# Patient Record
Sex: MALE | Race: WHITE
[De-identification: names, ages, dates, MRNs, and addresses within clinical notes are randomized per-mention and may not be internally consistent; named-entity substitution may affect disease eponyms.]

---

## 2018-12-17 ENCOUNTER — HOSPITAL ENCOUNTER (EMERGENCY)
Dept: HOSPITAL 89 - ER | Age: 53
Discharge: HOME | End: 2018-12-17
Payer: COMMERCIAL

## 2018-12-17 DIAGNOSIS — N20.0: Primary | ICD-10-CM

## 2018-12-17 LAB — PLATELET COUNT, AUTOMATED: 224 K/UL (ref 150–450)

## 2018-12-17 PROCEDURE — 96375 TX/PRO/DX INJ NEW DRUG ADDON: CPT

## 2018-12-17 PROCEDURE — 82247 BILIRUBIN TOTAL: CPT

## 2018-12-17 PROCEDURE — 84450 TRANSFERASE (AST) (SGOT): CPT

## 2018-12-17 PROCEDURE — 82435 ASSAY OF BLOOD CHLORIDE: CPT

## 2018-12-17 PROCEDURE — 82310 ASSAY OF CALCIUM: CPT

## 2018-12-17 PROCEDURE — 84295 ASSAY OF SERUM SODIUM: CPT

## 2018-12-17 PROCEDURE — 96374 THER/PROPH/DIAG INJ IV PUSH: CPT

## 2018-12-17 PROCEDURE — 84132 ASSAY OF SERUM POTASSIUM: CPT

## 2018-12-17 PROCEDURE — 84520 ASSAY OF UREA NITROGEN: CPT

## 2018-12-17 PROCEDURE — 84155 ASSAY OF PROTEIN SERUM: CPT

## 2018-12-17 PROCEDURE — 82565 ASSAY OF CREATININE: CPT

## 2018-12-17 PROCEDURE — 82040 ASSAY OF SERUM ALBUMIN: CPT

## 2018-12-17 PROCEDURE — 84460 ALANINE AMINO (ALT) (SGPT): CPT

## 2018-12-17 PROCEDURE — 85025 COMPLETE CBC W/AUTO DIFF WBC: CPT

## 2018-12-17 PROCEDURE — 82947 ASSAY GLUCOSE BLOOD QUANT: CPT

## 2018-12-17 PROCEDURE — 84075 ASSAY ALKALINE PHOSPHATASE: CPT

## 2018-12-17 PROCEDURE — 81001 URINALYSIS AUTO W/SCOPE: CPT

## 2018-12-17 PROCEDURE — 96376 TX/PRO/DX INJ SAME DRUG ADON: CPT

## 2018-12-17 PROCEDURE — 82374 ASSAY BLOOD CARBON DIOXIDE: CPT

## 2018-12-17 PROCEDURE — 99284 EMERGENCY DEPT VISIT MOD MDM: CPT

## 2018-12-17 NOTE — ER REPORT
History and Physical


Time Seen By MD:  08:41


Hx. of Stated Complaint:  


PATIENT REPORTS POSSIBLE KIDNEY STONE. RIGHT SIDED FLANK PAIN


HPI/ROS


This is a 53-year-old male with a history of hepatitis C who presents to the 


emergency department stating that he feels as if he has another kidney stone. 


This could be his 5th episode. Right-sided flank pain over him at 3:00 in the 


morning. He denies abdominal pain. Says this is exactly like his past kidney 


stones. He has spontaneously passed all of his kidney stones. No fever chills. 


Or dysuria.


Remainder of the 14 system rev:  Yes


Allergies:  


Coded Allergies:  


     No Known Drug Allergies (Unverified , 12/17/18)


Home Meds


Active Scripts


Ondansetron Hcl (ZOFRAN) 4 Mg Tablet, 4 MG PO Q12H, #8 TAB


   Prov:HARI ALVARADO MD         12/17/18


Ibuprofen (IBUPROFEN) 600 Mg Tablet, 1 TAB PO Q6H, #14 TAB


   Prov:HARI ALVARADO MD         12/17/18


Hydrocodone Bit/Acetaminophen (HYDROCODON-ACETAMINOPHEN 5-325) 1 Each Tablet, 1 


EACH PO Q4H PRN for PAIN, #12 TAB 0 Refills


   Prov:HARI ALVARADO MD         12/17/18


Tamsulosin Hcl (FLOMAX) 0.4 Mg Cap.er.24h, 0.4 MG PO QDAY for 3 Days, #3 CAP 0 


Refills


   Prov:HARI ALVARADO MD         12/17/18


Reviewed Nurses Notes:  Yes


Old Medical Records Reviewed:  Yes


Constitutional





Vital Sign - Last 24 Hours








 12/17/18





 08:33


 


Temp 98.2


 


Pulse 82


 


Resp 20


 


Pulse Ox 87








Physical Exam


General Appearance: The patient is alert, has no immediate need for airway 


protection and no current signs of toxicity.  


Eyes: Pupils equal and round no injection.


Respiratory: Chest is non tender, lungs are clear to auscultation.


Cardiac: regular rate and rhythm 


Gastrointestinal: Abdomen is soft and non tender, no masses, bowel sounds 


normal.


Musculoskeletal: No midline spinal TTP


Extremities have full range of motion and are non tender.


Skin: No rashes or lesions.





DIFFERENTIAL DIAGNOSIS: After history and physical exam differential diagnosis 


was considered for back pain including but not limited to muscular pain, 


herniated disc, spine fracture, intra-abdominal causes and urinary tract 


infection.





Medical Decision Making


Data Points


Result Diagram:  


12/17/18 0839                                                                   


            12/17/18 0839





Laboratory





Hematology








Test


 12/17/18


08:33 12/17/18


08:39


 


Urine Color Yellow  


 


Urine Clarity


 Slightly-cloudy


 





 


Urine pH


 5.0 pH


(4.8-9.5) 





 


Urine Specific Gravity 1.017  


 


Urine Protein


 30 mg/dL


(NEGATIVE) 





 


Urine Glucose (UA)


 Negative mg/dL


(NEGATIVE) 





 


Urine Ketones


 Negative mg/dL


(NEGATIVE) 





 


Urine Blood


 Large


(NEGATIVE) 





 


Urine Nitrite


 Negative


(NEGATIVE) 





 


Urine Bilirubin


 Negative


(NEGATIVE) 





 


Urine Urobilinogen


 Negative mg/dL


(0.2-1.9) 





 


Urine Leukocyte Esterase


 Negative


(NEGATIVE) 





 


Urine RBC


 912 /HPF


(0-2/HPF) 





 


Urine WBC


 1 /HPF


(0-5/HPF) 





 


Urine Squamous Epithelial


Cells None /LPF


(</=FEW) 





 


Urine Bacteria


 Negative /HPF


(NONE-FEW) 





 


Urine Mucus


 None /HPF


(NONE-FEW) 





 


Red Blood Count


 


 5.43 M/uL


(4.00-5.60)


 


Mean Corpuscular Volume


 


 90.3 fL


(80.0-96.0)


 


Mean Corpuscular Hemoglobin


 


 30.2 pg


(26.0-33.0)


 


Mean Corpuscular Hemoglobin


Concent 


 33.5 g/dL


(32.0-36.0)


 


Red Cell Distribution Width


 


 14.3 %


(11.5-14.5)


 


Mean Platelet Volume


 


 9.0 fL


(7.2-11.1)


 


Neutrophils (%) (Auto)


 


 69.9 %


(39.4-72.5)


 


Lymphocytes (%) (Auto)


 


 20.6 %


(17.6-49.6)


 


Monocytes (%) (Auto)


 


 6.6 %


(4.1-12.4)


 


Eosinophils (%) (Auto)


 


 2.1 %


(0.4-6.7)


 


Basophils (%) (Auto)


 


 0.8 %


(0.3-1.4)


 


Nucleated RBC Relative Count


(auto) 


 0.1 /100WBC 





 


Neutrophils # (Auto)


 


 4.0 K/uL


(2.0-7.4)


 


Lymphocytes # (Auto)


 


 1.2 K/uL


(1.3-3.6)


 


Monocytes # (Auto)


 


 0.4 K/uL


(0.3-1.0)


 


Eosinophils # (Auto)


 


 0.1 K/uL


(0.0-0.5)


 


Basophils # (Auto)


 


 0.0 K/uL


(0.0-0.1)


 


Nucleated RBC Absolute Count


(auto) 


 0.00 K/uL 





 


Sodium Level


 


 141 mmol/L


(137-145)


 


Potassium Level


 


 4.0 mmol/L


(3.5-5.0)


 


Chloride Level


 


 107 mmol/L


()


 


Carbon Dioxide Level


 


 25 mmol/L


(22-30)


 


Blood Urea Nitrogen  9 mg/dl (9-21) 


 


Creatinine


 


 0.80 mg/dl


(0.66-1.25)


 


Glomerular Filtration Rate


Calc 


 > 60.0 





 


Random Glucose


 


 119 mg/dl


()


 


Calcium Level


 


 9.4 mg/dl


(8.4-10.2)


 


Total Bilirubin


 


 0.6 mg/dl


(0.2-1.3)


 


Aspartate Amino Transf


(AST/SGOT) 


 28 U/L (0-35) 





 


Alanine Aminotransferase


(ALT/SGPT) 


 40 U/L (0-56) 





 


Alkaline Phosphatase  59 U/L (0-126) 


 


Total Protein


 


 7.7 g/dl


(6.3-8.2)


 


Albumin


 


 4.3 g/dl


(3.5-5.0)








Chemistry








Test


 12/17/18


08:33 12/17/18


08:39


 


Urine Color Yellow  


 


Urine Clarity


 Slightly-cloudy


 





 


Urine pH


 5.0 pH


(4.8-9.5) 





 


Urine Specific Gravity 1.017  


 


Urine Protein


 30 mg/dL


(NEGATIVE) 





 


Urine Glucose (UA)


 Negative mg/dL


(NEGATIVE) 





 


Urine Ketones


 Negative mg/dL


(NEGATIVE) 





 


Urine Blood


 Large


(NEGATIVE) 





 


Urine Nitrite


 Negative


(NEGATIVE) 





 


Urine Bilirubin


 Negative


(NEGATIVE) 





 


Urine Urobilinogen


 Negative mg/dL


(0.2-1.9) 





 


Urine Leukocyte Esterase


 Negative


(NEGATIVE) 





 


Urine RBC


 912 /HPF


(0-2/HPF) 





 


Urine WBC


 1 /HPF


(0-5/HPF) 





 


Urine Squamous Epithelial


Cells None /LPF


(</=FEW) 





 


Urine Bacteria


 Negative /HPF


(NONE-FEW) 





 


Urine Mucus


 None /HPF


(NONE-FEW) 





 


White Blood Count


 


 5.8 k/uL


(4.5-11.0)


 


Red Blood Count


 


 5.43 M/uL


(4.00-5.60)


 


Hemoglobin


 


 16.4 g/dL


(14.0-18.0)


 


Hematocrit


 


 49.1 %


(42.0-52.0)


 


Mean Corpuscular Volume


 


 90.3 fL


(80.0-96.0)


 


Mean Corpuscular Hemoglobin


 


 30.2 pg


(26.0-33.0)


 


Mean Corpuscular Hemoglobin


Concent 


 33.5 g/dL


(32.0-36.0)


 


Red Cell Distribution Width


 


 14.3 %


(11.5-14.5)


 


Platelet Count


 


 224 K/uL


(150-450)


 


Mean Platelet Volume


 


 9.0 fL


(7.2-11.1)


 


Neutrophils (%) (Auto)


 


 69.9 %


(39.4-72.5)


 


Lymphocytes (%) (Auto)


 


 20.6 %


(17.6-49.6)


 


Monocytes (%) (Auto)


 


 6.6 %


(4.1-12.4)


 


Eosinophils (%) (Auto)


 


 2.1 %


(0.4-6.7)


 


Basophils (%) (Auto)


 


 0.8 %


(0.3-1.4)


 


Nucleated RBC Relative Count


(auto) 


 0.1 /100WBC 





 


Neutrophils # (Auto)


 


 4.0 K/uL


(2.0-7.4)


 


Lymphocytes # (Auto)


 


 1.2 K/uL


(1.3-3.6)


 


Monocytes # (Auto)


 


 0.4 K/uL


(0.3-1.0)


 


Eosinophils # (Auto)


 


 0.1 K/uL


(0.0-0.5)


 


Basophils # (Auto)


 


 0.0 K/uL


(0.0-0.1)


 


Nucleated RBC Absolute Count


(auto) 


 0.00 K/uL 





 


Glomerular Filtration Rate


Calc 


 > 60.0 





 


Calcium Level


 


 9.4 mg/dl


(8.4-10.2)


 


Total Bilirubin


 


 0.6 mg/dl


(0.2-1.3)


 


Aspartate Amino Transf


(AST/SGOT) 


 28 U/L (0-35) 





 


Alanine Aminotransferase


(ALT/SGPT) 


 40 U/L (0-56) 





 


Alkaline Phosphatase  59 U/L (0-126) 


 


Total Protein


 


 7.7 g/dl


(6.3-8.2)


 


Albumin


 


 4.3 g/dl


(3.5-5.0)








Urinalysis








Test


 12/17/18


08:33


 


Urine Color Yellow 


 


Urine Clarity


 Slightly-cloudy





 


Urine pH


 5.0 pH


(4.8-9.5)


 


Urine Specific Gravity 1.017 


 


Urine Protein


 30 mg/dL


(NEGATIVE)


 


Urine Glucose (UA)


 Negative mg/dL


(NEGATIVE)


 


Urine Ketones


 Negative mg/dL


(NEGATIVE)


 


Urine Blood


 Large


(NEGATIVE)


 


Urine Nitrite


 Negative


(NEGATIVE)


 


Urine Bilirubin


 Negative


(NEGATIVE)


 


Urine Urobilinogen


 Negative mg/dL


(0.2-1.9)


 


Urine Leukocyte Esterase


 Negative


(NEGATIVE)


 


Urine RBC


 912 /HPF


(0-2/HPF)


 


Urine WBC


 1 /HPF


(0-5/HPF)


 


Urine Squamous Epithelial


Cells None /LPF


(</=FEW)


 


Urine Bacteria


 Negative /HPF


(NONE-FEW)


 


Urine Mucus


 None /HPF


(NONE-FEW)











ED Course/Re-evaluation


ED Course


Patient states pain is as previous kidney stones. No need for further imaging. 


No evidence of infected stone. Pain controlled with Toradol in the emergency 


department. We'll discharge him with both NSAIDs and Lortab in case his pain 


worsens. Also given Zofran and Flomax. He does not have a urologist, but states 


he knows he needs to stop drinking sodas in order to rid him of kidney stones. 


He'll return to the emergency department if his pain continues and is not 


controlled by home medication.


Decision to Disposition Date:  Dec 17, 2018


Decision to Disposition Time:  10:56





Depart


Departure


Latest Vital Signs





Vital Signs








  Date Time  Temp Pulse Resp B/P (MAP) Pulse Ox O2 Delivery O2 Flow Rate FiO2


 


12/17/18 08:33 98.2 82 20  87   








Impression:  


   Primary Impression:  


   Kidney stone on right side


Condition:  Improved


Disposition:  HOME OR SELF-CARE


New Scripts


Ondansetron Hcl (ZOFRAN) 4 Mg Tablet


4 MG PO Q12H, #8 TAB


   Prov: HARI ALVARADO MD         12/17/18 


Ibuprofen (IBUPROFEN) 600 Mg Tablet


1 TAB PO Q6H, #14 TAB


   Prov: HARI ALVARADO MD         12/17/18 


Hydrocodone Bit/Acetaminophen (HYDROCODON-ACETAMINOPHEN 5-325) 1 Each Tablet


1 EACH PO Q4H PRN for PAIN, #12 TAB 0 Refills


   Prov: HARI ALVARADO MD         12/17/18 


Tamsulosin Hcl (FLOMAX) 0.4 Mg Cap.er.24h


0.4 MG PO QDAY for 3 Days, #3 CAP 0 Refills


   Prov: HARI ALVARADO MD         12/17/18


Patient Instructions:  Kidney Stones (ED)











HARI ALVARADO MD                 Dec 17, 2018 08:41